# Patient Record
Sex: FEMALE | Race: WHITE
[De-identification: names, ages, dates, MRNs, and addresses within clinical notes are randomized per-mention and may not be internally consistent; named-entity substitution may affect disease eponyms.]

---

## 2018-11-27 ENCOUNTER — HOSPITAL ENCOUNTER (OUTPATIENT)
Dept: HOSPITAL 75 - RAD | Age: 28
End: 2018-11-27
Attending: OBSTETRICS & GYNECOLOGY
Payer: COMMERCIAL

## 2018-11-27 DIAGNOSIS — Z3A.21: ICD-10-CM

## 2018-11-27 DIAGNOSIS — Z36.89: Primary | ICD-10-CM

## 2018-11-27 PROCEDURE — 76805 OB US >/= 14 WKS SNGL FETUS: CPT

## 2018-11-27 NOTE — DIAGNOSTIC IMAGING REPORT
INDICATION: Fetal anatomical assessment.



TECHNIQUE: Multiple real-time grayscale images were obtained over

the gravid uterus.



COMPARISON: None.



FINDINGS: There is presence of single viable intrauterine

pregnancy, currently in breech presentation. There is normal

amount of amniotic fluid. Placenta is posterior and without

evidence for previa.



Visualized fetal anatomical structures including the kidneys,

bladder, stomach, intracranial structures, four-chamber heart,

three-vessel cord, and cord insertion site as well as spine

appearing unremarkable. No suggestion for fetal abnormality.



Maternal adnexa not imaged.



Biometrical measurements are as follows:

Biparietal 4.96 cm, age 21 weeks 1 days.

Head circumference 19.08 cm, age 21 weeks 3 days.

Abdominal circumference 16.36 cm, age 21 weeks 3 days.

Femur length 3.37 cm, age 20 weeks 4 days.



Sonographic estimate age: 21 weeks 1 days.

Sonographic estimated date of delivery: 4/8/2019.



Estimated Fetal Weight: 397 gm (+/- 58  gm).

LMP percentile: 94%.



Fetal heart rate: 135 beats per minute.



Fetal number: 1 of 1.



IMPRESSION: Single viable intrauterine pregnancy, currently in

breech presentation. Sonographic estimated age is 21 weeks 1 day

for an estimated date of delivery of April 8, 2019. No

abnormalities noted at this time. It is noted that dating is

approximately 8 days advanced compared to patient's reported LMP.

Clinical correlation recommended.



Dictated by: 



  Dictated on workstation # [id number] No